# Patient Record
Sex: FEMALE | Race: WHITE | NOT HISPANIC OR LATINO
[De-identification: names, ages, dates, MRNs, and addresses within clinical notes are randomized per-mention and may not be internally consistent; named-entity substitution may affect disease eponyms.]

---

## 2017-01-03 ENCOUNTER — APPOINTMENT (OUTPATIENT)
Dept: PULMONOLOGY | Facility: CLINIC | Age: 67
End: 2017-01-03

## 2017-01-03 ENCOUNTER — OTHER (OUTPATIENT)
Age: 67
End: 2017-01-03

## 2017-01-03 VITALS
WEIGHT: 187 LBS | HEART RATE: 107 BPM | BODY MASS INDEX: 32.1 KG/M2 | DIASTOLIC BLOOD PRESSURE: 80 MMHG | OXYGEN SATURATION: 98 % | SYSTOLIC BLOOD PRESSURE: 152 MMHG | RESPIRATION RATE: 16 BRPM

## 2017-01-03 DIAGNOSIS — Z23 ENCOUNTER FOR IMMUNIZATION: ICD-10-CM

## 2017-03-09 ENCOUNTER — OTHER (OUTPATIENT)
Age: 67
End: 2017-03-09

## 2017-03-21 ENCOUNTER — OTHER (OUTPATIENT)
Age: 67
End: 2017-03-21

## 2017-03-31 ENCOUNTER — OTHER (OUTPATIENT)
Age: 67
End: 2017-03-31

## 2017-07-13 ENCOUNTER — APPOINTMENT (OUTPATIENT)
Dept: PULMONOLOGY | Facility: CLINIC | Age: 67
End: 2017-07-13

## 2017-07-13 VITALS
SYSTOLIC BLOOD PRESSURE: 118 MMHG | HEART RATE: 79 BPM | WEIGHT: 185 LBS | OXYGEN SATURATION: 98 % | BODY MASS INDEX: 31.76 KG/M2 | DIASTOLIC BLOOD PRESSURE: 78 MMHG

## 2017-07-13 DIAGNOSIS — J30.9 ALLERGIC RHINITIS, UNSPECIFIED: ICD-10-CM

## 2017-11-14 ENCOUNTER — APPOINTMENT (OUTPATIENT)
Dept: PULMONOLOGY | Facility: CLINIC | Age: 67
End: 2017-11-14
Payer: COMMERCIAL

## 2017-11-14 VITALS
SYSTOLIC BLOOD PRESSURE: 132 MMHG | WEIGHT: 193 LBS | OXYGEN SATURATION: 98 % | HEART RATE: 78 BPM | BODY MASS INDEX: 33.13 KG/M2 | DIASTOLIC BLOOD PRESSURE: 80 MMHG | RESPIRATION RATE: 16 BRPM

## 2017-11-14 PROCEDURE — 99214 OFFICE O/P EST MOD 30 MIN: CPT

## 2018-03-19 ENCOUNTER — FORM ENCOUNTER (OUTPATIENT)
Age: 68
End: 2018-03-19

## 2018-03-20 ENCOUNTER — OUTPATIENT (OUTPATIENT)
Dept: OUTPATIENT SERVICES | Facility: HOSPITAL | Age: 68
LOS: 1 days | End: 2018-03-20
Payer: COMMERCIAL

## 2018-03-20 ENCOUNTER — APPOINTMENT (OUTPATIENT)
Dept: CT IMAGING | Facility: CLINIC | Age: 68
End: 2018-03-20
Payer: COMMERCIAL

## 2018-03-20 DIAGNOSIS — R91.1 SOLITARY PULMONARY NODULE: ICD-10-CM

## 2018-03-20 PROCEDURE — 71250 CT THORAX DX C-: CPT | Mod: 26

## 2018-03-20 PROCEDURE — 71250 CT THORAX DX C-: CPT

## 2018-05-18 ENCOUNTER — APPOINTMENT (OUTPATIENT)
Dept: PULMONOLOGY | Facility: CLINIC | Age: 68
End: 2018-05-18

## 2018-06-12 ENCOUNTER — APPOINTMENT (OUTPATIENT)
Dept: PULMONOLOGY | Facility: CLINIC | Age: 68
End: 2018-06-12
Payer: COMMERCIAL

## 2018-06-12 VITALS
SYSTOLIC BLOOD PRESSURE: 130 MMHG | DIASTOLIC BLOOD PRESSURE: 80 MMHG | HEART RATE: 78 BPM | OXYGEN SATURATION: 98 % | BODY MASS INDEX: 32.79 KG/M2 | RESPIRATION RATE: 16 BRPM | WEIGHT: 191 LBS

## 2018-06-12 PROCEDURE — 99214 OFFICE O/P EST MOD 30 MIN: CPT | Mod: 25

## 2018-06-12 PROCEDURE — 94010 BREATHING CAPACITY TEST: CPT

## 2018-06-12 RX ORDER — PREDNISONE 10 MG/1
10 TABLET ORAL
Qty: 90 | Refills: 1 | Status: DISCONTINUED | COMMUNITY
Start: 2017-12-19 | End: 2018-06-12

## 2018-12-04 ENCOUNTER — APPOINTMENT (OUTPATIENT)
Dept: PULMONOLOGY | Facility: CLINIC | Age: 68
End: 2018-12-04
Payer: COMMERCIAL

## 2018-12-04 VITALS
BODY MASS INDEX: 33.3 KG/M2 | HEART RATE: 80 BPM | SYSTOLIC BLOOD PRESSURE: 128 MMHG | OXYGEN SATURATION: 96 % | RESPIRATION RATE: 16 BRPM | DIASTOLIC BLOOD PRESSURE: 80 MMHG | WEIGHT: 194 LBS

## 2018-12-04 PROCEDURE — 99214 OFFICE O/P EST MOD 30 MIN: CPT

## 2019-04-01 ENCOUNTER — FORM ENCOUNTER (OUTPATIENT)
Age: 69
End: 2019-04-01

## 2019-04-02 ENCOUNTER — APPOINTMENT (OUTPATIENT)
Dept: CT IMAGING | Facility: CLINIC | Age: 69
End: 2019-04-02
Payer: COMMERCIAL

## 2019-04-02 ENCOUNTER — OUTPATIENT (OUTPATIENT)
Dept: OUTPATIENT SERVICES | Facility: HOSPITAL | Age: 69
LOS: 1 days | End: 2019-04-02
Payer: COMMERCIAL

## 2019-04-02 DIAGNOSIS — R91.1 SOLITARY PULMONARY NODULE: ICD-10-CM

## 2019-04-02 PROCEDURE — 71250 CT THORAX DX C-: CPT | Mod: 26

## 2019-04-02 PROCEDURE — 71250 CT THORAX DX C-: CPT

## 2019-06-04 ENCOUNTER — APPOINTMENT (OUTPATIENT)
Dept: PULMONOLOGY | Facility: CLINIC | Age: 69
End: 2019-06-04
Payer: COMMERCIAL

## 2019-06-04 VITALS
HEART RATE: 82 BPM | OXYGEN SATURATION: 97 % | BODY MASS INDEX: 32.79 KG/M2 | WEIGHT: 191 LBS | SYSTOLIC BLOOD PRESSURE: 142 MMHG | RESPIRATION RATE: 16 BRPM | DIASTOLIC BLOOD PRESSURE: 80 MMHG

## 2019-06-04 PROCEDURE — 99214 OFFICE O/P EST MOD 30 MIN: CPT

## 2019-07-15 ENCOUNTER — MEDICATION RENEWAL (OUTPATIENT)
Age: 69
End: 2019-07-15

## 2019-09-05 ENCOUNTER — APPOINTMENT (OUTPATIENT)
Dept: PULMONOLOGY | Facility: CLINIC | Age: 69
End: 2019-09-05
Payer: COMMERCIAL

## 2019-09-05 VITALS
HEART RATE: 89 BPM | OXYGEN SATURATION: 96 % | SYSTOLIC BLOOD PRESSURE: 130 MMHG | WEIGHT: 188 LBS | HEIGHT: 64 IN | BODY MASS INDEX: 32.1 KG/M2 | DIASTOLIC BLOOD PRESSURE: 80 MMHG

## 2019-09-05 PROCEDURE — 94664 DEMO&/EVAL PT USE INHALER: CPT | Mod: 59

## 2019-09-05 PROCEDURE — 94727 GAS DIL/WSHOT DETER LNG VOL: CPT

## 2019-09-05 PROCEDURE — 94060 EVALUATION OF WHEEZING: CPT

## 2019-09-05 PROCEDURE — 94729 DIFFUSING CAPACITY: CPT

## 2019-09-05 PROCEDURE — 99214 OFFICE O/P EST MOD 30 MIN: CPT | Mod: 25

## 2019-09-05 PROCEDURE — 85018 HEMOGLOBIN: CPT | Mod: QW

## 2020-01-22 ENCOUNTER — TRANSCRIPTION ENCOUNTER (OUTPATIENT)
Age: 70
End: 2020-01-22

## 2020-03-20 ENCOUNTER — OUTPATIENT (OUTPATIENT)
Dept: OUTPATIENT SERVICES | Facility: HOSPITAL | Age: 70
LOS: 1 days | End: 2020-03-20
Payer: COMMERCIAL

## 2020-03-20 ENCOUNTER — APPOINTMENT (OUTPATIENT)
Dept: CT IMAGING | Facility: CLINIC | Age: 70
End: 2020-03-20
Payer: COMMERCIAL

## 2020-03-20 DIAGNOSIS — R91.1 SOLITARY PULMONARY NODULE: ICD-10-CM

## 2020-03-20 DIAGNOSIS — Z00.00 ENCOUNTER FOR GENERAL ADULT MEDICAL EXAMINATION WITHOUT ABNORMAL FINDINGS: ICD-10-CM

## 2020-03-20 PROCEDURE — 71250 CT THORAX DX C-: CPT | Mod: 26

## 2020-03-20 PROCEDURE — 71250 CT THORAX DX C-: CPT

## 2020-04-20 ENCOUNTER — APPOINTMENT (OUTPATIENT)
Dept: THORACIC SURGERY | Facility: CLINIC | Age: 70
End: 2020-04-20
Payer: COMMERCIAL

## 2020-04-20 PROCEDURE — 99202 OFFICE O/P NEW SF 15 MIN: CPT | Mod: 95

## 2020-04-20 NOTE — HISTORY OF PRESENT ILLNESS
[Home] : at home, [unfilled] , at the time of the visit. [Self] : self [Patient] : the patient [Medical Office: ([facility])___] : at the medical office located in  [FreeTextEntry4] : Abe Pace, NP [FreeTextEntry1] : Charles was contacted today by phone for an initial consultation. She has had CT scans of the chest done in the past that demonstrated a small patchy left lower lobe nodule that is mildly increased in size from the previous imaging study. The etiology remains unclear. She does have significant shortness of breath with activity functions well on a daily basis. She denies fever, chills, night sweats, weight loss or weight gain.

## 2020-04-20 NOTE — ASSESSMENT
[FreeTextEntry1] : Elvi is a 69-year-old female with a small patchy nodule in the left lower lobe that has increased mildly in size. The etiology remains unclear but I do believe continued surveillance is appropriate. She is already scheduled for a CT scan in 3 months time and I have asked her to followup with me shortly after that is complete.\par \par Thank you for allowing me to participate in the care of your patient.\par \par Antoine Goel MD\par Department of Cardiovascular and Thoracic Surgery\par \par [employer]\[facility] [employer] at [facility]\par

## 2020-05-29 ENCOUNTER — APPOINTMENT (OUTPATIENT)
Dept: PULMONOLOGY | Facility: CLINIC | Age: 70
End: 2020-05-29

## 2020-07-08 ENCOUNTER — OUTPATIENT (OUTPATIENT)
Dept: OUTPATIENT SERVICES | Facility: HOSPITAL | Age: 70
LOS: 1 days | End: 2020-07-08
Payer: COMMERCIAL

## 2020-07-08 ENCOUNTER — APPOINTMENT (OUTPATIENT)
Dept: CT IMAGING | Facility: CLINIC | Age: 70
End: 2020-07-08
Payer: COMMERCIAL

## 2020-07-08 DIAGNOSIS — R91.1 SOLITARY PULMONARY NODULE: ICD-10-CM

## 2020-07-08 DIAGNOSIS — Z00.00 ENCOUNTER FOR GENERAL ADULT MEDICAL EXAMINATION WITHOUT ABNORMAL FINDINGS: ICD-10-CM

## 2020-07-08 PROCEDURE — 71250 CT THORAX DX C-: CPT | Mod: 26

## 2020-07-08 PROCEDURE — 71250 CT THORAX DX C-: CPT

## 2021-01-26 ENCOUNTER — APPOINTMENT (OUTPATIENT)
Dept: CT IMAGING | Facility: CLINIC | Age: 71
End: 2021-01-26
Payer: MEDICARE

## 2021-01-26 ENCOUNTER — OUTPATIENT (OUTPATIENT)
Dept: OUTPATIENT SERVICES | Facility: HOSPITAL | Age: 71
LOS: 1 days | End: 2021-01-26
Payer: MEDICARE

## 2021-01-26 ENCOUNTER — RESULT REVIEW (OUTPATIENT)
Age: 71
End: 2021-01-26

## 2021-01-26 DIAGNOSIS — R91.8 OTHER NONSPECIFIC ABNORMAL FINDING OF LUNG FIELD: ICD-10-CM

## 2021-01-26 PROCEDURE — 71250 CT THORAX DX C-: CPT

## 2021-01-26 PROCEDURE — 71250 CT THORAX DX C-: CPT | Mod: 26,MH

## 2021-03-03 ENCOUNTER — APPOINTMENT (OUTPATIENT)
Dept: PULMONOLOGY | Facility: CLINIC | Age: 71
End: 2021-03-03

## 2021-03-17 ENCOUNTER — APPOINTMENT (OUTPATIENT)
Dept: PULMONOLOGY | Facility: CLINIC | Age: 71
End: 2021-03-17
Payer: MEDICARE

## 2021-03-17 PROCEDURE — 99214 OFFICE O/P EST MOD 30 MIN: CPT | Mod: 95

## 2021-08-26 ENCOUNTER — OUTPATIENT (OUTPATIENT)
Dept: OUTPATIENT SERVICES | Facility: HOSPITAL | Age: 71
LOS: 1 days | End: 2021-08-26

## 2021-08-26 DIAGNOSIS — R91.1 SOLITARY PULMONARY NODULE: ICD-10-CM

## 2021-08-26 DIAGNOSIS — R91.8 OTHER NONSPECIFIC ABNORMAL FINDING OF LUNG FIELD: ICD-10-CM

## 2021-08-30 ENCOUNTER — OUTPATIENT (OUTPATIENT)
Dept: OUTPATIENT SERVICES | Facility: HOSPITAL | Age: 71
LOS: 1 days | End: 2021-08-30
Payer: MEDICARE

## 2021-08-30 ENCOUNTER — APPOINTMENT (OUTPATIENT)
Dept: CT IMAGING | Facility: CLINIC | Age: 71
End: 2021-08-30
Payer: MEDICARE

## 2021-08-30 DIAGNOSIS — R91.1 SOLITARY PULMONARY NODULE: ICD-10-CM

## 2021-08-30 DIAGNOSIS — R91.8 OTHER NONSPECIFIC ABNORMAL FINDING OF LUNG FIELD: ICD-10-CM

## 2021-08-30 PROCEDURE — 71250 CT THORAX DX C-: CPT | Mod: 26,ME

## 2021-08-30 PROCEDURE — G1004: CPT

## 2021-08-30 PROCEDURE — 71250 CT THORAX DX C-: CPT | Mod: ME

## 2021-09-07 ENCOUNTER — APPOINTMENT (OUTPATIENT)
Dept: PULMONOLOGY | Facility: CLINIC | Age: 71
End: 2021-09-07
Payer: MEDICARE

## 2021-09-07 VITALS
WEIGHT: 177 LBS | DIASTOLIC BLOOD PRESSURE: 74 MMHG | HEART RATE: 79 BPM | OXYGEN SATURATION: 97 % | BODY MASS INDEX: 30.38 KG/M2 | SYSTOLIC BLOOD PRESSURE: 130 MMHG | RESPIRATION RATE: 16 BRPM

## 2021-09-07 DIAGNOSIS — R04.2 HEMOPTYSIS: ICD-10-CM

## 2021-09-07 PROCEDURE — 99214 OFFICE O/P EST MOD 30 MIN: CPT

## 2021-09-14 ENCOUNTER — APPOINTMENT (OUTPATIENT)
Dept: THORACIC SURGERY | Facility: CLINIC | Age: 71
End: 2021-09-14
Payer: MEDICARE

## 2021-09-14 VITALS
BODY MASS INDEX: 30.48 KG/M2 | HEIGHT: 64 IN | DIASTOLIC BLOOD PRESSURE: 78 MMHG | SYSTOLIC BLOOD PRESSURE: 155 MMHG | HEART RATE: 97 BPM | WEIGHT: 178.56 LBS | OXYGEN SATURATION: 86 %

## 2021-09-14 PROCEDURE — 99214 OFFICE O/P EST MOD 30 MIN: CPT

## 2021-09-14 NOTE — DATA REVIEWED
[FreeTextEntry1] : 9.2.21 Non- Contrast CT Chest Scan from [facility] \par Left lower lobe mixed solid and groundglass nodule demonstrate minimal if any interval increase in size since January 26, 2021 and mild interval increase in size of the solid component since March 20, 2018. Primary differential diagnostic consideration is a slow growing primary lung neoplasm.\par \par Emphysema.

## 2021-09-14 NOTE — PHYSICAL EXAM
[] : no respiratory distress [Auscultation Breath Sounds / Voice Sounds] : lungs were clear to auscultation bilaterally [Heart Sounds] : normal S1 and S2 [Heart Rate And Rhythm] : heart rate was normal and rhythm regular [Heart Sounds Gallop] : no gallops [Murmurs] : no murmurs [Heart Sounds Pericardial Friction Rub] : no pericardial rub [Examination Of The Chest] : the chest was normal in appearance [Chest Visual Inspection Thoracic Asymmetry] : no chest asymmetry [Diminished Respiratory Excursion] : normal chest expansion [No Focal Deficits] : no focal deficits [Oriented To Time, Place, And Person] : oriented to person, place, and time [Impaired Insight] : insight and judgment were intact [Affect] : the affect was normal

## 2021-09-14 NOTE — CONSULT LETTER
[Dear  ___] : Dear  [unfilled], [Courtesy Letter:] : I had the pleasure of seeing your patient, [unfilled], in my office today. [Please see my note below.] : Please see my note below. [Consult Closing:] : Thank you very much for allowing me to participate in the care of this patient.  If you have any questions, please do not hesitate to contact me. [Sincerely,] : Sincerely, [FreeTextEntry2] : Dr. Albino Neely

## 2021-09-14 NOTE — ASSESSMENT
[FreeTextEntry1] : Elvi is a 71 year old female with significant COPD and a small nodule in the left lower lobe that has had minimal change since 2018.  I do believe continued surveillance is appropriate at this time and have recommended a CT in 6 months time.\par \par Thank you for allowing me to participate in the care of your patient.\par \par 30 minutes was spent during this encounter.\par \par \par Antoine Goel MD\par Department of Cardiovascular and Thoracic Surgery\par \par [employer]\[facility] [employer] at [facility]\par

## 2022-01-05 ENCOUNTER — APPOINTMENT (OUTPATIENT)
Dept: PULMONOLOGY | Facility: CLINIC | Age: 72
End: 2022-01-05
Payer: MEDICARE

## 2022-01-05 VITALS
BODY MASS INDEX: 30.22 KG/M2 | SYSTOLIC BLOOD PRESSURE: 136 MMHG | HEART RATE: 84 BPM | DIASTOLIC BLOOD PRESSURE: 82 MMHG | OXYGEN SATURATION: 98 % | WEIGHT: 177 LBS | HEIGHT: 64 IN | RESPIRATION RATE: 16 BRPM

## 2022-01-05 PROCEDURE — 99214 OFFICE O/P EST MOD 30 MIN: CPT

## 2022-04-05 ENCOUNTER — OUTPATIENT (OUTPATIENT)
Dept: OUTPATIENT SERVICES | Facility: HOSPITAL | Age: 72
LOS: 1 days | End: 2022-04-05
Payer: MEDICARE

## 2022-04-05 ENCOUNTER — APPOINTMENT (OUTPATIENT)
Dept: CT IMAGING | Facility: CLINIC | Age: 72
End: 2022-04-05
Payer: MEDICARE

## 2022-04-05 DIAGNOSIS — R91.8 OTHER NONSPECIFIC ABNORMAL FINDING OF LUNG FIELD: ICD-10-CM

## 2022-04-05 PROCEDURE — 71250 CT THORAX DX C-: CPT | Mod: ME

## 2022-04-05 PROCEDURE — 71250 CT THORAX DX C-: CPT | Mod: 26,ME

## 2022-04-05 PROCEDURE — G1004: CPT

## 2022-04-11 ENCOUNTER — APPOINTMENT (OUTPATIENT)
Dept: THORACIC SURGERY | Facility: CLINIC | Age: 72
End: 2022-04-11
Payer: MEDICARE

## 2022-04-11 VITALS
HEART RATE: 93 BPM | OXYGEN SATURATION: 96 % | HEIGHT: 64 IN | WEIGHT: 175 LBS | BODY MASS INDEX: 29.88 KG/M2 | RESPIRATION RATE: 16 BRPM

## 2022-04-11 PROCEDURE — 99214 OFFICE O/P EST MOD 30 MIN: CPT

## 2022-04-11 NOTE — PHYSICAL EXAM
[] : no respiratory distress [Auscultation Breath Sounds / Voice Sounds] : lungs were clear to auscultation bilaterally [Examination Of The Chest] : the chest was normal in appearance [Chest Visual Inspection Thoracic Asymmetry] : no chest asymmetry [Diminished Respiratory Excursion] : normal chest expansion [No Spinal Tenderness] : no spinal tenderness [No Focal Deficits] : no focal deficits [Oriented To Time, Place, And Person] : oriented to person, place, and time [Impaired Insight] : insight and judgment were intact [Affect] : the affect was normal

## 2022-04-11 NOTE — DATA REVIEWED
[FreeTextEntry1] : CT Scan of the cehst on 4/7/22 at [facility]\par IMPRESSION:\par \par 1.1 cm subsolid nodule with a 0.5 cm solid component within left lower lobe is not significantly changed from recent prior exams, but increased from 2018. Recommend continued follow-ups. Further evaluation with PET CT is another option.

## 2022-04-11 NOTE — ASSESSMENT
[FreeTextEntry1] : Elvi is a 71-year-old female with a part solid nodule that has been present since approximately 2016. There is no change compared with her last imaging study with some growth since prior studies. Etiology remains unclear, but unfortunately she does have significant COPD, making biopsy, and or removal, difficult. Based on the above I have recommended a CT scan in 6 months time to continue surveillance.\par \par Thank you for allowing me to participate in the care of your patient.\par \par 30 minutes was spent during this encounter.\par \par Antoine Goel MD\par Department of Cardiovascular and Thoracic Surgery\par \par [employer]\par [employer] at \[facility]\""/[facility]\par

## 2022-04-11 NOTE — REVIEW OF SYSTEMS
[SOB on Exertion] : shortness of breath during exertion [Negative] : Heme/Lymph [Feeling Poorly] : not feeling poorly [Feeling Tired] : not feeling tired [Chest Pain] : no chest pain [Palpitations] : no palpitations [Shortness Of Breath] : no shortness of breath [Cough] : no cough

## 2022-04-11 NOTE — HISTORY OF PRESENT ILLNESS
[FreeTextEntry1] : Ms. VENTURA is a 71 year old female who is here for a follow up CT scan of the chest that demonstrated a small patchy left lower lobe nodule that is mildly increased in size from the previous imaging study. The etiology remains unclear. Of note she does have significant shortness of breath with activity functions on a daily basis. \par \par She denies fever, chills, night sweats, weight loss or weight gain. Discussed in our previous visit was scheduling Non- Contrast CT Chest Scan for surveillance purposes. She is here to review her latest results. \par \par

## 2022-05-27 ENCOUNTER — APPOINTMENT (OUTPATIENT)
Dept: PULMONOLOGY | Facility: CLINIC | Age: 72
End: 2022-05-27
Payer: MEDICARE

## 2022-05-27 VITALS
DIASTOLIC BLOOD PRESSURE: 82 MMHG | WEIGHT: 179 LBS | SYSTOLIC BLOOD PRESSURE: 130 MMHG | HEIGHT: 64 IN | HEART RATE: 85 BPM | RESPIRATION RATE: 16 BRPM | BODY MASS INDEX: 30.56 KG/M2 | OXYGEN SATURATION: 99 %

## 2022-05-27 PROCEDURE — 99214 OFFICE O/P EST MOD 30 MIN: CPT

## 2022-05-27 RX ORDER — PREDNISONE 10 MG/1
10 TABLET ORAL
Qty: 21 | Refills: 0 | Status: DISCONTINUED | COMMUNITY
Start: 2022-01-05 | End: 2022-05-27

## 2022-10-18 ENCOUNTER — APPOINTMENT (OUTPATIENT)
Dept: CT IMAGING | Facility: CLINIC | Age: 72
End: 2022-10-18

## 2022-10-18 ENCOUNTER — OUTPATIENT (OUTPATIENT)
Dept: OUTPATIENT SERVICES | Facility: HOSPITAL | Age: 72
LOS: 1 days | End: 2022-10-18
Payer: MEDICARE

## 2022-10-18 DIAGNOSIS — R91.1 SOLITARY PULMONARY NODULE: ICD-10-CM

## 2022-10-18 PROCEDURE — G1004: CPT

## 2022-10-18 PROCEDURE — 71250 CT THORAX DX C-: CPT | Mod: 26,ME

## 2022-10-18 PROCEDURE — 71250 CT THORAX DX C-: CPT | Mod: ME

## 2022-11-22 ENCOUNTER — APPOINTMENT (OUTPATIENT)
Dept: PULMONOLOGY | Facility: CLINIC | Age: 72
End: 2022-11-22

## 2022-11-22 ENCOUNTER — APPOINTMENT (OUTPATIENT)
Dept: THORACIC SURGERY | Facility: CLINIC | Age: 72
End: 2022-11-22

## 2022-11-22 VITALS
SYSTOLIC BLOOD PRESSURE: 158 MMHG | WEIGHT: 182 LBS | DIASTOLIC BLOOD PRESSURE: 84 MMHG | OXYGEN SATURATION: 99 % | HEIGHT: 64 IN | BODY MASS INDEX: 31.07 KG/M2 | HEART RATE: 88 BPM

## 2022-11-22 VITALS
RESPIRATION RATE: 16 BRPM | OXYGEN SATURATION: 97 % | HEART RATE: 74 BPM | SYSTOLIC BLOOD PRESSURE: 158 MMHG | DIASTOLIC BLOOD PRESSURE: 80 MMHG

## 2022-11-22 PROCEDURE — 99214 OFFICE O/P EST MOD 30 MIN: CPT | Mod: 25

## 2022-11-22 PROCEDURE — 94727 GAS DIL/WSHOT DETER LNG VOL: CPT

## 2022-11-22 PROCEDURE — 85018 HEMOGLOBIN: CPT | Mod: QW

## 2022-11-22 PROCEDURE — 99214 OFFICE O/P EST MOD 30 MIN: CPT

## 2022-11-22 PROCEDURE — 94729 DIFFUSING CAPACITY: CPT

## 2022-11-22 PROCEDURE — 94010 BREATHING CAPACITY TEST: CPT

## 2022-11-22 NOTE — HISTORY OF PRESENT ILLNESS
[FreeTextEntry1] : Hx emphysema, COPD, lung nodules.\par \par At baseline.  Uses proaire rarely. Some SOB with heavier exertion. No wheeze.   \par \par F/U CT chest done reviewed below. Slight change in density of nodule since 2019. Saw Dr Goel who agreed on observation. \par \par Does worse in Winter usually. \par \par Gets no exercise. \par \par Quit smoking 2004/05.\par \par

## 2022-11-22 NOTE — PROCEDURE
[FreeTextEntry1] : PFT done today: obstruction in the severe range; air trapping; DLCO normal. No significant change.\par \par -----------\par EXAM: [id number] - CT CHEST - ORDERED BY: DIRK VIDAL\par \par \par PROCEDURE DATE: 10/18/2022\par \par \par \par INTERPRETATION: CLINICAL INFORMATION: Brachiocephalic nodule left lung all\par \par TECHNIQUE: A volumetric CT acquisition of the chest was obtained from the thoracic inlet to the upper abdomen, without the administration of intravenous contrast. Coronal and sagittal multiplanar reformations were also submitted.\par \par Comparison: 4/5/2022, 4/2/2019\par \par FINDINGS:\par \par Lungs/Airways/Pleura: Stable 1.1 x 1 cm part solid nodule in the left lower lobe on image 647 series 5 with 0.5 cm solid component. There is been slight increase in density of this nodule compared to 4/2019 and minimal increase in size when it measured 0.9 x 0.9 cm. The solid component is also minimally increased. Apical predominant centrilobular and paraseptal emphysema is unchanged.. No new lung nodule. No pleural effusion.\par \par Mediastinum/Lymph nodes: No thoracic adenopathy.\par \par Heart and Vessels: The heart is normal in size. No pericardial effusion. There are mild coronary artery calcifications. The aorta is normal in caliber.\par \par Upper Abdomen: Unremarkable.\par \par Osseous structures and Soft Tissues: No aggressive bone lesions.\par \par IMPRESSION:\par \par 1. Stable part solid left lower lobe nodule, minimally progressed since 4/2019. Reassessment can be made on surveillance imaging\par \par 2. Emphysema\par \par --- End of Report ---\par \par \par \par \par \par \par  CLARISA CASTILLO M.D., Attending Radiologist\par This document has been electronically signed. Oct 21 2022 11:59AM

## 2022-11-22 NOTE — DATA REVIEWED
[FreeTextEntry1] : CT CHEST  - ORDERED BY: DIRK VIDAL\par \par \par PROCEDURE DATE:  10/18/2022\par \par \par \par INTERPRETATION:  CLINICAL INFORMATION: Brachiocephalic nodule left lung all\par \par TECHNIQUE:  A volumetric CT acquisition of the chest was obtained from the thoracic inlet to the upper abdomen, without the administration  of intravenous contrast. Coronal and sagittal multiplanar reformations were also submitted.\par \par Comparison: 4/5/2022, 4/2/2019\par \par FINDINGS:\par \par Lungs/Airways/Pleura: Stable 1.1 x 1 cm part solid nodule in the left lower lobe on image 647 series 5 with 0.5 cm solid component. There is been slight increase in density of this nodule compared to 4/2019 and minimal increase in size when it measured 0.9 x 0.9 cm. The solid component is also minimally increased. Apical predominant centrilobular and paraseptal emphysema is unchanged.. No new lung nodule. No pleural effusion.\par \par Mediastinum/Lymph nodes: No thoracic adenopathy.\par \par Heart and Vessels: The heart is normal in size. No pericardial effusion. There are mild coronary artery calcifications. The aorta is normal in caliber.\par \par Upper Abdomen: Unremarkable.\par \par Osseous structures and Soft Tissues: No aggressive bone lesions.\par \par IMPRESSION:\par \par 1.  Stable part solid left lower lobe nodule, minimally progressed since 4/2019. Reassessment can be made on surveillance imaging\par \par 2.  Emphysema\par \par --- End of Report ---\par \par \par

## 2022-11-22 NOTE — REVIEW OF SYSTEMS
[Fever] : no fever [Chills] : no chills [Fatigue] : no fatigue [Poor Appetite] : normal appetite  [Nasal Congestion] : nasal congestion [Epistaxis] : no nosebleeds [Postnasal Drip] : postnasal drip [Sinus Problems] : sinus problems [PND] : no PND [Orthopnea] : no orthopnea [Murmurs] : no murmurs were heard [Palpitations] : no palpitations [Edema] : ~T edema was not present [As Noted in HPI] : as noted in HPI [Nasal Discharge] : nasal discharge [Heartburn] : no heartburn [Reflux] : no reflux [Indigestion] : no indigestion [Nocturia] : no nocturia [Frequency] : no change in urinary frequency [Dysuria] : no dysuria [Trauma] : no ~T physical trauma [Fracture] : no fracture [Anemia] : no anemia [Headache] : no headache [Dizziness] : no dizziness [Syncope] : no fainting

## 2022-11-22 NOTE — ASSESSMENT
[FreeTextEntry1] : Elvi is a 72-year-old female with a history of COPD and a small nodule in the left lower lobe that is patchy part solid and has remained largely unchanged for several years.  I do believe continued surveillance is appropriate and I have asked her to obtain a CT scan in 6 months time.\par \par Thank you for allowing me to participate in the care of your patient.\par \par 30 minutes was spent during this encounter.\par \par Antoine Goel MD\par Department of Cardiovascular and Thoracic Surgery\par \par [employer]\[facility] [employer] at [facility]\par

## 2022-11-22 NOTE — CONSULT LETTER
[Dear  ___] : Dear  [unfilled], [Courtesy Letter:] : I had the pleasure of seeing your patient, [unfilled], in my office today. [Consult Closing:] : Thank you very much for allowing me to participate in the care of this patient.  If you have any questions, please do not hesitate to contact me. [Albino Neely MD] : Albino Neely MD

## 2023-03-13 ENCOUNTER — APPOINTMENT (OUTPATIENT)
Dept: PULMONOLOGY | Facility: CLINIC | Age: 73
End: 2023-03-13

## 2023-03-23 ENCOUNTER — APPOINTMENT (OUTPATIENT)
Dept: PULMONOLOGY | Facility: CLINIC | Age: 73
End: 2023-03-23
Payer: MEDICARE

## 2023-03-23 PROCEDURE — ZZZZZ: CPT

## 2023-03-28 ENCOUNTER — APPOINTMENT (OUTPATIENT)
Dept: PULMONOLOGY | Facility: CLINIC | Age: 73
End: 2023-03-28
Payer: MEDICARE

## 2023-03-28 PROCEDURE — 94625 PHY/QHP OP PULM RHB W/O MNTR: CPT

## 2023-03-30 ENCOUNTER — APPOINTMENT (OUTPATIENT)
Dept: PULMONOLOGY | Facility: CLINIC | Age: 73
End: 2023-03-30
Payer: MEDICARE

## 2023-03-30 PROCEDURE — 94625 PHY/QHP OP PULM RHB W/O MNTR: CPT

## 2023-04-04 ENCOUNTER — APPOINTMENT (OUTPATIENT)
Dept: PULMONOLOGY | Facility: CLINIC | Age: 73
End: 2023-04-04
Payer: MEDICARE

## 2023-04-04 PROCEDURE — 94625 PHY/QHP OP PULM RHB W/O MNTR: CPT

## 2023-04-06 ENCOUNTER — APPOINTMENT (OUTPATIENT)
Dept: PULMONOLOGY | Facility: CLINIC | Age: 73
End: 2023-04-06
Payer: MEDICARE

## 2023-04-06 PROCEDURE — 94625 PHY/QHP OP PULM RHB W/O MNTR: CPT

## 2023-04-11 ENCOUNTER — APPOINTMENT (OUTPATIENT)
Dept: PULMONOLOGY | Facility: CLINIC | Age: 73
End: 2023-04-11
Payer: MEDICARE

## 2023-04-11 PROCEDURE — 94625 PHY/QHP OP PULM RHB W/O MNTR: CPT

## 2023-04-13 ENCOUNTER — APPOINTMENT (OUTPATIENT)
Dept: PULMONOLOGY | Facility: CLINIC | Age: 73
End: 2023-04-13

## 2023-04-18 ENCOUNTER — APPOINTMENT (OUTPATIENT)
Dept: PULMONOLOGY | Facility: CLINIC | Age: 73
End: 2023-04-18
Payer: MEDICARE

## 2023-04-18 PROCEDURE — 94625 PHY/QHP OP PULM RHB W/O MNTR: CPT

## 2023-04-20 ENCOUNTER — APPOINTMENT (OUTPATIENT)
Dept: PULMONOLOGY | Facility: CLINIC | Age: 73
End: 2023-04-20
Payer: MEDICARE

## 2023-04-20 PROCEDURE — 94625 PHY/QHP OP PULM RHB W/O MNTR: CPT

## 2023-04-25 ENCOUNTER — APPOINTMENT (OUTPATIENT)
Dept: PULMONOLOGY | Facility: CLINIC | Age: 73
End: 2023-04-25
Payer: MEDICARE

## 2023-04-25 PROCEDURE — 94625 PHY/QHP OP PULM RHB W/O MNTR: CPT

## 2023-04-27 ENCOUNTER — APPOINTMENT (OUTPATIENT)
Dept: PULMONOLOGY | Facility: CLINIC | Age: 73
End: 2023-04-27
Payer: MEDICARE

## 2023-04-27 PROCEDURE — 94625 PHY/QHP OP PULM RHB W/O MNTR: CPT

## 2023-05-02 ENCOUNTER — APPOINTMENT (OUTPATIENT)
Dept: PULMONOLOGY | Facility: CLINIC | Age: 73
End: 2023-05-02
Payer: MEDICARE

## 2023-05-02 PROCEDURE — 94625 PHY/QHP OP PULM RHB W/O MNTR: CPT

## 2023-05-04 ENCOUNTER — APPOINTMENT (OUTPATIENT)
Dept: PULMONOLOGY | Facility: CLINIC | Age: 73
End: 2023-05-04

## 2023-05-05 ENCOUNTER — APPOINTMENT (OUTPATIENT)
Dept: CT IMAGING | Facility: CLINIC | Age: 73
End: 2023-05-05

## 2023-05-05 ENCOUNTER — OUTPATIENT (OUTPATIENT)
Dept: OUTPATIENT SERVICES | Facility: HOSPITAL | Age: 73
LOS: 1 days | End: 2023-05-05
Payer: MEDICARE

## 2023-05-05 DIAGNOSIS — R91.1 SOLITARY PULMONARY NODULE: ICD-10-CM

## 2023-05-05 PROCEDURE — 71250 CT THORAX DX C-: CPT | Mod: 26,MH

## 2023-05-05 PROCEDURE — 71250 CT THORAX DX C-: CPT

## 2023-05-09 ENCOUNTER — APPOINTMENT (OUTPATIENT)
Dept: PULMONOLOGY | Facility: CLINIC | Age: 73
End: 2023-05-09
Payer: MEDICARE

## 2023-05-09 PROCEDURE — 94625 PHY/QHP OP PULM RHB W/O MNTR: CPT

## 2023-05-11 ENCOUNTER — APPOINTMENT (OUTPATIENT)
Dept: PULMONOLOGY | Facility: CLINIC | Age: 73
End: 2023-05-11
Payer: MEDICARE

## 2023-05-11 PROCEDURE — 94625 PHY/QHP OP PULM RHB W/O MNTR: CPT

## 2023-05-16 ENCOUNTER — APPOINTMENT (OUTPATIENT)
Dept: PULMONOLOGY | Facility: CLINIC | Age: 73
End: 2023-05-16
Payer: MEDICARE

## 2023-05-16 PROCEDURE — 94625 PHY/QHP OP PULM RHB W/O MNTR: CPT

## 2023-05-18 ENCOUNTER — APPOINTMENT (OUTPATIENT)
Dept: PULMONOLOGY | Facility: CLINIC | Age: 73
End: 2023-05-18
Payer: MEDICARE

## 2023-05-18 PROCEDURE — 94625 PHY/QHP OP PULM RHB W/O MNTR: CPT

## 2023-05-23 ENCOUNTER — APPOINTMENT (OUTPATIENT)
Dept: PULMONOLOGY | Facility: CLINIC | Age: 73
End: 2023-05-23
Payer: MEDICARE

## 2023-05-23 PROCEDURE — 94625 PHY/QHP OP PULM RHB W/O MNTR: CPT

## 2023-05-25 ENCOUNTER — APPOINTMENT (OUTPATIENT)
Dept: PULMONOLOGY | Facility: CLINIC | Age: 73
End: 2023-05-25
Payer: MEDICARE

## 2023-05-25 PROCEDURE — 94625 PHY/QHP OP PULM RHB W/O MNTR: CPT

## 2023-05-30 ENCOUNTER — APPOINTMENT (OUTPATIENT)
Dept: PULMONOLOGY | Facility: CLINIC | Age: 73
End: 2023-05-30
Payer: MEDICARE

## 2023-05-30 PROCEDURE — 94625 PHY/QHP OP PULM RHB W/O MNTR: CPT

## 2023-06-01 ENCOUNTER — APPOINTMENT (OUTPATIENT)
Dept: PULMONOLOGY | Facility: CLINIC | Age: 73
End: 2023-06-01

## 2023-06-06 ENCOUNTER — APPOINTMENT (OUTPATIENT)
Dept: PULMONOLOGY | Facility: CLINIC | Age: 73
End: 2023-06-06

## 2023-06-08 ENCOUNTER — APPOINTMENT (OUTPATIENT)
Dept: PULMONOLOGY | Facility: CLINIC | Age: 73
End: 2023-06-08

## 2023-06-13 ENCOUNTER — APPOINTMENT (OUTPATIENT)
Dept: PULMONOLOGY | Facility: CLINIC | Age: 73
End: 2023-06-13

## 2023-06-15 ENCOUNTER — APPOINTMENT (OUTPATIENT)
Dept: PULMONOLOGY | Facility: CLINIC | Age: 73
End: 2023-06-15

## 2023-06-20 ENCOUNTER — APPOINTMENT (OUTPATIENT)
Dept: PULMONOLOGY | Facility: CLINIC | Age: 73
End: 2023-06-20
Payer: MEDICARE

## 2023-06-20 PROCEDURE — 94625 PHY/QHP OP PULM RHB W/O MNTR: CPT

## 2023-06-22 ENCOUNTER — APPOINTMENT (OUTPATIENT)
Dept: PULMONOLOGY | Facility: CLINIC | Age: 73
End: 2023-06-22
Payer: MEDICARE

## 2023-06-22 PROCEDURE — 94625 PHY/QHP OP PULM RHB W/O MNTR: CPT

## 2023-06-27 ENCOUNTER — APPOINTMENT (OUTPATIENT)
Dept: PULMONOLOGY | Facility: CLINIC | Age: 73
End: 2023-06-27
Payer: MEDICARE

## 2023-06-27 PROCEDURE — 94625 PHY/QHP OP PULM RHB W/O MNTR: CPT

## 2023-06-29 ENCOUNTER — APPOINTMENT (OUTPATIENT)
Dept: PULMONOLOGY | Facility: CLINIC | Age: 73
End: 2023-06-29

## 2023-07-05 ENCOUNTER — APPOINTMENT (OUTPATIENT)
Dept: PULMONOLOGY | Facility: CLINIC | Age: 73
End: 2023-07-05
Payer: MEDICARE

## 2023-07-05 VITALS
DIASTOLIC BLOOD PRESSURE: 78 MMHG | RESPIRATION RATE: 16 BRPM | OXYGEN SATURATION: 97 % | SYSTOLIC BLOOD PRESSURE: 128 MMHG

## 2023-07-05 VITALS — WEIGHT: 183 LBS | BODY MASS INDEX: 31.24 KG/M2 | HEIGHT: 64 IN

## 2023-07-05 PROCEDURE — 99214 OFFICE O/P EST MOD 30 MIN: CPT

## 2023-07-05 NOTE — PROCEDURE
[FreeTextEntry1] : EXAM: [id number] - CT CHEST - ORDERED BY: DIRK VIDAL\par \par \par PROCEDURE DATE: 05/05/2023\par \par \par \par INTERPRETATION: INDICATION: Left lower lobe nodule\par \par TECHNIQUE: A volumetric CT acquisition of the chest was obtained from the thoracic inlet to the upper abdomen without the use of intravenous contrast. Coronal and sagittal reconstructed images are provided.\par \par COMPARISON: Chest CT 10/18/2022\par \par FINDINGS:\par \par Lungs/Airways/Pleura: Emphysema is present. Biapical scarring. The central airways are patent. No pleural effusion. Part solid and groundglass 1.1 cm left lower lobe nodule on series 3, image 103 with a 4 mm solid component remains unchanged since the prior study and increased in size and density compared to 2016. Stable 4 mm nodule in the posterior left upper lobe abutting the fissure on image 66 since 2016. No new nodules.\par \par Mediastinum/Lymph nodes: No thoracic adenopathy. Unremarkable thyroid.\par \par Heart and Vessels: The great vessels are normal in size. The heart is normal in size. No pericardial effusion. Qualitatively mild coronary artery calcification.\par \par Upper Abdomen: Infrarenal aortic calcification.\par \par Osseous structures and Soft Tissues: No aggressive bone lesions.\par \par IMPRESSION:\par Unchanged 1.1 cm part solid left lower lobe nodule (4 mm solid component) when compared to the prior study, increased in both size and density since 2016. Continued surveillance recommended.\par \par No new nodules.\par \par --- End of Report ---\par \par \par \par \par \par \par ESME THOMPSON M.D., Attending Radiologist

## 2023-07-05 NOTE — REVIEW OF SYSTEMS
[Nasal Congestion] : nasal congestion [Postnasal Drip] : postnasal drip [Sinus Problems] : sinus problems [As Noted in HPI] : as noted in HPI [Nasal Discharge] : nasal discharge [Fever] : no fever [Chills] : no chills [Fatigue] : no fatigue [Poor Appetite] : normal appetite  [Epistaxis] : no nosebleeds [PND] : no PND [Orthopnea] : no orthopnea [Murmurs] : no murmurs were heard [Palpitations] : no palpitations [Edema] : ~T edema was not present [Heartburn] : no heartburn [Reflux] : no reflux [Indigestion] : no indigestion [Nocturia] : no nocturia [Frequency] : no change in urinary frequency [Dysuria] : no dysuria [Trauma] : no ~T physical trauma [Fracture] : no fracture [Anemia] : no anemia [Headache] : no headache [Dizziness] : no dizziness [Syncope] : no fainting

## 2023-07-05 NOTE — HISTORY OF PRESENT ILLNESS
[Former] : former [YearQuit] : 2004/05 [FreeTextEntry1] : Hx emphysema, COPD, lung nodules.\par \par At baseline.  Uses proaire; needed it last week during a bad air quality day. Some SOB with heavier exertion. No wheeze.   \par \par Doing pulm rehab; just about completed. \par \par F/U CT chest done reviewed below.  Saw Dr Goel who agreed on observation. \par \par Does worse in Winter usually. \par \par Quit smoking 2004/05.\par \par

## 2023-07-05 NOTE — PHYSICAL EXAM
[General Appearance - Well Developed] : well developed [Normal Appearance] : normal appearance [General Appearance - Well Nourished] : well nourished [No Deformities] : no deformities [Normal Conjunctiva] : the conjunctiva exhibited no abnormalities [Eyelids - No Xanthelasma] : the eyelids demonstrated no xanthelasmas [Neck Appearance] : the appearance of the neck was normal [Neck Cervical Mass (___cm)] : no neck mass was observed [Jugular Venous Distention Increased] : there was no jugular-venous distention [Heart Rate And Rhythm] : heart rate and rhythm were normal [Heart Sounds] : normal S1 and S2 [Murmurs] : no murmurs present [Arterial Pulses Normal] : the arterial pulses were normal [] : no respiratory distress [Respiration, Rhythm And Depth] : normal respiratory rhythm and effort [Exaggerated Use Of Accessory Muscles For Inspiration] : no accessory muscle use [Auscultation Breath Sounds / Voice Sounds] : lungs were clear to auscultation bilaterally [Bowel Sounds] : normal bowel sounds [Abdomen Soft] : soft [Abnormal Walk] : normal gait [Cranial Nerves] : cranial nerves 2-12 were intact [Deep Tendon Reflexes (DTR)] : deep tendon reflexes were 2+ and symmetric [Oriented To Time, Place, And Person] : oriented to person, place, and time [Impaired Insight] : insight and judgment were intact [Normal Oral Mucosa] : normal oral mucosa [No Oral Pallor] : no oral pallor [No Oral Cyanosis] : no oral cyanosis [Normal Oropharynx] : normal oropharynx [Nail Clubbing] : no clubbing of the fingernails [Cyanosis, Localized] : no localized cyanosis

## 2023-07-11 ENCOUNTER — APPOINTMENT (OUTPATIENT)
Dept: PULMONOLOGY | Facility: CLINIC | Age: 73
End: 2023-07-11
Payer: MEDICARE

## 2023-07-11 PROCEDURE — 94625 PHY/QHP OP PULM RHB W/O MNTR: CPT

## 2023-07-24 ENCOUNTER — APPOINTMENT (OUTPATIENT)
Dept: THORACIC SURGERY | Facility: CLINIC | Age: 73
End: 2023-07-24
Payer: MEDICARE

## 2023-07-24 DIAGNOSIS — R05.9 COUGH, UNSPECIFIED: ICD-10-CM

## 2023-07-24 PROCEDURE — 99442: CPT | Mod: 95

## 2023-07-24 RX ORDER — CLINDAMYCIN PHOSPHATE 10 MG/ML
1 SOLUTION TOPICAL
Qty: 60 | Refills: 0 | Status: COMPLETED | COMMUNITY
Start: 2023-04-10 | End: 2023-07-24

## 2023-07-24 RX ORDER — KETOCONAZOLE 20 MG/G
2 CREAM TOPICAL
Qty: 60 | Refills: 0 | Status: COMPLETED | COMMUNITY
Start: 2023-05-20 | End: 2023-07-24

## 2023-07-24 RX ORDER — PREDNISONE 10 MG/1
10 TABLET ORAL
Qty: 21 | Refills: 0 | Status: COMPLETED | COMMUNITY
Start: 2022-05-27 | End: 2023-07-24

## 2023-07-24 NOTE — REASON FOR VISIT
[Home] : at home, [unfilled] , at the time of the visit. [Medical Office: ([facility])___] : at the medical office located in  [This encounter was initiated by telehealth (audio with video) and converted to telephone (audio only) due to technical difficulties.] : This encounter was initiated by telehealth (audio with video) and converted to telephone (audio only) due to technical difficulties. [Patient] : the patient

## 2023-07-26 NOTE — HISTORY OF PRESENT ILLNESS
[FreeTextEntry1] : Ms. CARLITOS VENTURA is a 72 year female who presents today for followup. Previously, she has been followed for left lower lobe nodule which has slowly demonstrated mild growth since 2019. She presents today to review and discuss surveillance 1 year CT Chest results. \par \par Additional past medical history includes asthma, chronic obstructive pulmonary disease, diverticulosis\par \par Today the patient reports feeling well. She does have some mild shortness of breath with exertion but it's unchanged. Denies fever, chills, nausea, chest pain, palpitation, swelling or weight loss. \par

## 2023-07-26 NOTE — ASSESSMENT
[FreeTextEntry1] : LLL 1.1cm part solid nodule, overall increased size and density since 2016 but unchanged since prior\par - recommend PET/CT

## 2023-12-19 ENCOUNTER — APPOINTMENT (OUTPATIENT)
Dept: CT IMAGING | Facility: CLINIC | Age: 73
End: 2023-12-19
Payer: MEDICARE

## 2023-12-19 ENCOUNTER — OUTPATIENT (OUTPATIENT)
Dept: OUTPATIENT SERVICES | Facility: HOSPITAL | Age: 73
LOS: 1 days | End: 2023-12-19
Payer: MEDICARE

## 2023-12-19 DIAGNOSIS — R91.8 OTHER NONSPECIFIC ABNORMAL FINDING OF LUNG FIELD: ICD-10-CM

## 2023-12-19 DIAGNOSIS — R91.1 SOLITARY PULMONARY NODULE: ICD-10-CM

## 2023-12-19 PROCEDURE — 71250 CT THORAX DX C-: CPT | Mod: 26,MH

## 2023-12-19 PROCEDURE — 71250 CT THORAX DX C-: CPT

## 2024-01-04 ENCOUNTER — APPOINTMENT (OUTPATIENT)
Dept: PULMONOLOGY | Facility: CLINIC | Age: 74
End: 2024-01-04
Payer: MEDICARE

## 2024-01-04 VITALS
WEIGHT: 187 LBS | BODY MASS INDEX: 31.92 KG/M2 | OXYGEN SATURATION: 98 % | RESPIRATION RATE: 16 BRPM | SYSTOLIC BLOOD PRESSURE: 138 MMHG | DIASTOLIC BLOOD PRESSURE: 80 MMHG | HEIGHT: 64 IN | HEART RATE: 72 BPM

## 2024-01-04 DIAGNOSIS — J45.909 UNSPECIFIED ASTHMA, UNCOMPLICATED: ICD-10-CM

## 2024-01-04 DIAGNOSIS — Z03.89 ENCOUNTER FOR OBSERVATION FOR OTHER SUSPECTED DISEASES AND CONDITIONS RULED OUT: ICD-10-CM

## 2024-01-04 PROCEDURE — 99214 OFFICE O/P EST MOD 30 MIN: CPT

## 2024-01-04 NOTE — PLAN
[TextEntry] : PET/CT scan. Albuterol prn. Continue nasonex prn. Nasal saline.  Reviewed symptoms of AECOPD. Exercise. F/U with CTS. Reviewed that the nodule could represent a slow growin malignancy. Will confer with CTS about resection vs waiting if PET neg. Annual flu vaccine.

## 2024-01-04 NOTE — HISTORY OF PRESENT ILLNESS
[Former] : former [YearQuit] : 2004/05 [FreeTextEntry1] : Hx emphysema, COPD, lung nodules.  At baseline.  Uses proaire; needed it last week during a bad air quality day. Some SOB with heavier exertion. No wheeze.     Completed pulm rehab. Not doing exercise on her own though.   F/U CT chest done reviewed below.  Saw Dr Guillaume in the summer; PET recc; she did not do it; unclear why.   Does worse in Winter usually.   Quit smoking 2004/05.

## 2024-01-04 NOTE — PROCEDURE
[FreeTextEntry1] :  EXAM: [id number] - CT CHEST - ORDERED BY: NOA TURNER DATE: 12/19/2023    INTERPRETATION: EXAMINATION: CT CHEST  CLINICAL INDICATION: Lung nodule.  TECHNIQUE: Noncontrast CT of the chest was obtained.  COMPARISON: Multiple CT, most recent 5/5/2023.  FINDINGS:  AIRWAYS AND LUNGS: The central tracheobronchial tree is patent. 1.1 cm left lower lobe part solid nodule with 4 mm solid component is unchanged from several prior studies but enlarged from 2016. Emphysema is present.  MEDIASTINUM AND PLEURA: There are no enlarged mediastinal, hilar or axillary lymph nodes. The visualized portion of the thyroid gland is unremarkable. There is no pleural effusion. There is no pneumothorax.  HEART AND VESSELS: The heart is normal in size. There are atherosclerotic calcifications of the aorta and coronary arteries. There is no pericardial effusion.  UPPER ABDOMEN: Images of the upper abdomen demonstrate bilateral parapelvic renal cysts.  BONES AND SOFT TISSUES: The bones are unremarkable. The soft tissues are unremarkable.  IMPRESSION: 1.1 cm left lower lobe part solid nodule with 4 mm solid component is unchanged from several prior studies but enlarged from 2016. Indolent malignancy is the primary consideration.  --- End of Report ---       JHONNY MERCADO MD; Attending Radiologist This document has been electronically signed. Jan 1 2024 2:43PM

## 2024-01-12 RX ORDER — ALBUTEROL SULFATE 90 UG/1
108 (90 BASE) INHALANT RESPIRATORY (INHALATION)
Qty: 3 | Refills: 0 | Status: ACTIVE | COMMUNITY
Start: 2017-01-03 | End: 1900-01-01

## 2024-03-22 NOTE — DATA REVIEWED
In an effort to ensure that our patients LiveWell, a Team Member has reviewed your chart and identified an opportunity to provide the best care possible. An attempt was made to discuss or schedule overdue Preventive or Disease Management screening.     The Outcome was Contact was made, appointment declined. Care Gaps include Immunizations and Wellness Visits.  Name: ALEX OLMSTEAD    ### Patient Details  YOB: 1945  MRN: [id number]    ### Encounter Details  Arrival Date: N/A  Discharge Date: N/A  Encounter ID: [id number]    ### Related interaction  Located within [facility] [place] Annual Wellness Outreach (Annual Wellness Outreach ([place])) ([email or web address])    ### Questions     Question 1   Annual Wellness   If you would like to speak with someone now to schedule your wellness visit, press 1; if you would like us to call you back later to schedule your wellness visit, press 2; if you would prefer not to schedule an appointment at this time, please press 3, or if you've already scheduled a wellness visit this year press 4.   Transfer Now (VOICE) (Issue Panel: Annual Wellness Live Transfer )    ### Required Interventions and Feedback     Call Status         Call Status List:     Inbound Call (edited by HENRY on 03/22/2024 12:02 PM CDT)     Appointment Scheduling         Unable to Schedule MWV Appointment:     Patient declined (edited by HENRY on 03/22/2024 12:02 PM CDT)  
[FreeTextEntry1] : CT Scan of the chest from 05/05/23\par COMPARISON: Chest CT 10/18/2022\par \par FINDINGS:\par \par Lungs/Airways/Pleura: Emphysema is present. Biapical scarring. The central airways are patent. No pleural effusion. Part solid and groundglass 1.1 cm left lower lobe nodule on series 3, image 103 with a 4 mm solid component remains unchanged since the prior study and increased in size and density compared to 2016. Stable 4 mm nodule in the posterior left upper lobe abutting the fissure on image 66 since 2016. No new nodules.\par \par Mediastinum/Lymph nodes: No thoracic adenopathy. Unremarkable thyroid.\par \par Heart and Vessels: The great vessels are normal in size. The heart is normal in size. No pericardial effusion. Qualitatively mild coronary artery calcification.\par \par Upper Abdomen: Infrarenal aortic calcification.\par \par Osseous structures and Soft Tissues: No aggressive bone lesions.\par \par IMPRESSION:\par Unchanged 1.1 cm part solid left lower lobe nodule (4 mm solid component) when compared to the prior study, increased in both size and density since 2016. Continued surveillance recommended.\par \par No new nodules.\par \par \par \par \par \par CT CHEST - \par PROCEDURE DATE: 10/18/2022\par \par INTERPRETATION: CLINICAL INFORMATION: Brachiocephalic nodule left lung all\par \par TECHNIQUE: A volumetric CT acquisition of the chest was obtained from the thoracic inlet to the upper abdomen, without the administration of intravenous contrast. Coronal and sagittal multiplanar reformations were also submitted.\par \par Comparison: 4/5/2022, 4/2/2019\par \par FINDINGS:\par \par Lungs/Airways/Pleura: Stable 1.1 x 1 cm part solid nodule in the left lower lobe on image 647 series 5 with 0.5 cm solid component. There is been slight increase in density of this nodule compared to 4/2019 and minimal increase in size when it measured 0.9 x 0.9 cm. The solid component is also minimally increased. Apical predominant centrilobular and paraseptal emphysema is unchanged.. No new lung nodule. No pleural effusion.\par \par Mediastinum/Lymph nodes: No thoracic adenopathy.\par \par Heart and Vessels: The heart is normal in size. No pericardial effusion. There are mild coronary artery calcifications. The aorta is normal in caliber.\par \par Upper Abdomen: Unremarkable.\par \par Osseous structures and Soft Tissues: No aggressive bone lesions.\par \par IMPRESSION:\par \par 1. Stable part solid left lower lobe nodule, minimally progressed since 4/2019. Reassessment can be made on surveillance imaging\par \par 2. Emphysema\par \par --- End of Report ---

## 2024-05-11 NOTE — PHYSICAL EXAM
Labs look good cont same f/u as scheduled [General Appearance - Well Developed] : well developed [Normal Appearance] : normal appearance [General Appearance - Well Nourished] : well nourished [No Deformities] : no deformities [Normal Conjunctiva] : the conjunctiva exhibited no abnormalities [Eyelids - No Xanthelasma] : the eyelids demonstrated no xanthelasmas [Neck Appearance] : the appearance of the neck was normal [Neck Cervical Mass (___cm)] : no neck mass was observed [Jugular Venous Distention Increased] : there was no jugular-venous distention [Heart Rate And Rhythm] : heart rate and rhythm were normal [Heart Sounds] : normal S1 and S2 [Murmurs] : no murmurs present [Arterial Pulses Normal] : the arterial pulses were normal [] : no respiratory distress [Respiration, Rhythm And Depth] : normal respiratory rhythm and effort [Exaggerated Use Of Accessory Muscles For Inspiration] : no accessory muscle use [Auscultation Breath Sounds / Voice Sounds] : lungs were clear to auscultation bilaterally [Bowel Sounds] : normal bowel sounds [Abdomen Soft] : soft [Abnormal Walk] : normal gait [Cranial Nerves] : cranial nerves 2-12 were intact [Deep Tendon Reflexes (DTR)] : deep tendon reflexes were 2+ and symmetric [Oriented To Time, Place, And Person] : oriented to person, place, and time [Impaired Insight] : insight and judgment were intact [Normal Oral Mucosa] : normal oral mucosa [No Oral Pallor] : no oral pallor [No Oral Cyanosis] : no oral cyanosis [Normal Oropharynx] : normal oropharynx [Nail Clubbing] : no clubbing of the fingernails [Cyanosis, Localized] : no localized cyanosis

## 2024-06-27 ENCOUNTER — APPOINTMENT (OUTPATIENT)
Dept: CT IMAGING | Facility: CLINIC | Age: 74
End: 2024-06-27

## 2024-06-27 ENCOUNTER — OUTPATIENT (OUTPATIENT)
Dept: OUTPATIENT SERVICES | Facility: HOSPITAL | Age: 74
LOS: 1 days | End: 2024-06-27
Payer: MEDICARE

## 2024-06-27 DIAGNOSIS — R91.8 OTHER NONSPECIFIC ABNORMAL FINDING OF LUNG FIELD: ICD-10-CM

## 2024-06-27 PROCEDURE — 71250 CT THORAX DX C-: CPT | Mod: 26,MH

## 2024-06-27 PROCEDURE — 71250 CT THORAX DX C-: CPT

## 2024-07-02 ENCOUNTER — APPOINTMENT (OUTPATIENT)
Dept: PULMONOLOGY | Facility: CLINIC | Age: 74
End: 2024-07-02
Payer: MEDICARE

## 2024-07-02 VITALS — WEIGHT: 187 LBS | BODY MASS INDEX: 31.92 KG/M2 | HEIGHT: 64 IN

## 2024-07-02 VITALS
RESPIRATION RATE: 16 BRPM | OXYGEN SATURATION: 98 % | SYSTOLIC BLOOD PRESSURE: 172 MMHG | HEART RATE: 84 BPM | DIASTOLIC BLOOD PRESSURE: 76 MMHG

## 2024-07-02 DIAGNOSIS — R91.8 OTHER NONSPECIFIC ABNORMAL FINDING OF LUNG FIELD: ICD-10-CM

## 2024-07-02 DIAGNOSIS — R91.1 SOLITARY PULMONARY NODULE: ICD-10-CM

## 2024-07-02 DIAGNOSIS — Z87.891 PERSONAL HISTORY OF NICOTINE DEPENDENCE: ICD-10-CM

## 2024-07-02 DIAGNOSIS — J44.89 OTHER SPECIFIED CHRONIC OBSTRUCTIVE PULMONARY DISEASE: ICD-10-CM

## 2024-07-02 DIAGNOSIS — J43.9 EMPHYSEMA, UNSPECIFIED: ICD-10-CM

## 2024-07-02 PROCEDURE — 99214 OFFICE O/P EST MOD 30 MIN: CPT | Mod: 25

## 2024-07-02 PROCEDURE — 94010 BREATHING CAPACITY TEST: CPT

## 2024-08-05 ENCOUNTER — APPOINTMENT (OUTPATIENT)
Dept: THORACIC SURGERY | Facility: CLINIC | Age: 74
End: 2024-08-05

## 2024-08-05 PROCEDURE — 99214 OFFICE O/P EST MOD 30 MIN: CPT

## 2024-08-05 NOTE — DATA REVIEWED
[FreeTextEntry1] : Spirometry performed at [facility] Pul[facility] Medicine at [place] on 7/2/24: FEV 1 41%     CT CHEST  performed through [facility] PROCEDURE DATE:  06/27/2024 INTERPRETATION:  CLINICAL INFORMATION: Lung nodule follow-up COMPARISON: Most recent CT chest 12/19/2023 and older exams dating back to 1/8/2016. CONTRAST/COMPLICATIONS: IV Contrast: None Oral Contrast: None Complications: None  PROCEDURE: CT of the Chest was performed. Sagittal and coronal reformats were performed.  FINDINGS:  LUNGS AND LARGE AIRWAYS: Patent central airways. Stable biapical scarring and emphysema. 1.1 cm left lower lobe groundglass nodule with subcentimeter central dense component unchanged from the previous exam. Increased in size since 2016. Malignancy is not excluded. 2 mm left lower lobe nodule (3:106) unchanged. PLEURA: No pleural effusion. VESSELS: Within normal limits. HEART: Heart size is normal. No pericardial effusion. MEDIASTINUM AND BASIA: No lymphadenopathy. CHEST WALL AND LOWER NECK: Within normal limits. VISUALIZED UPPER ABDOMEN: Bilateral parapelvic renal cysts. Aortic atherosclerotic changes. BONES: Degenerative changes.  IMPRESSION: 1.1 cm left lower lobe groundglass nodule with subcentimeter central dense component unchanged from the previous exam. Increased in size since 2016. Malignancy is not excluded.  --- End of Report ---  SPENCER ARREOLA MD; Attending Radiologist        CT CHEST  performed through [facility] PROCEDURE DATE:  12/19/2023 INTERPRETATION:  EXAMINATION: CT CHEST CLINICAL INDICATION: Lung nodule. TECHNIQUE: Noncontrast CT of the chest was obtained. COMPARISON: Multiple CT, most recent 5/5/2023.  FINDINGS:  AIRWAYS AND LUNGS: The central tracheobronchial tree is patent.  1.1 cm left lower lobe part solid nodule with 4 mm solid component is unchanged from several prior studies but enlarged from 2016. Emphysema is present.  MEDIASTINUM AND PLEURA: There are no enlarged mediastinal, hilar or axillary lymph nodes. The visualized portion of the thyroid gland is unremarkable. There is no pleural effusion. There is no pneumothorax.  HEART AND VESSELS: The heart is normal in size.  There are atherosclerotic calcifications of the aorta and coronary arteries.  There is no pericardial effusion.  UPPER ABDOMEN: Images of the upper abdomen demonstrate bilateral parapelvic renal cysts.  BONES AND SOFT TISSUES: The bones are unremarkable.  The soft tissues are unremarkable.  IMPRESSION: 1.1 cm left lower lobe part solid nodule with 4 mm solid component is unchanged from several prior studies but enlarged from 2016. Indolent malignancy is the primary consideration.  --- End of Report ---  JHONNY MERCADO MD; Attending Radiologist

## 2024-08-05 NOTE — ASSESSMENT
[FreeTextEntry1] : Elvi is a 73-year-old female with a left lower lobe nodule that is part solid and is demonstrated very slow growth since 2019.  It is unchanged from her prior scan.  She was seen a year ago and a PET scan was recommended but this has not yet been completed.  At this point she is in agreement to proceed with PET scan and this will be scheduled through my office.  Thank you for allowing me to participate in the care of your patient.  30 minutes was spent during this encounter.  Antoine Goel MD Department of Cardiovascular and Thoracic Surgery  [employer] at [facility]

## 2024-08-05 NOTE — PHYSICAL EXAM
[] : no respiratory distress [Auscultation Breath Sounds / Voice Sounds] : lungs were clear to auscultation bilaterally [Heart Rate And Rhythm] : heart rate was normal and rhythm regular [Heart Sounds] : normal S1 and S2 [Murmurs] : no murmurs [Heart Sounds Gallop] : no gallops [Heart Sounds Pericardial Friction Rub] : no pericardial rub [Examination Of The Chest] : the chest was normal in appearance [Chest Visual Inspection Thoracic Asymmetry] : no chest asymmetry [Diminished Respiratory Excursion] : normal chest expansion [No Focal Deficits] : no focal deficits [Oriented To Time, Place, And Person] : oriented to person, place, and time [Affect] : the affect was normal [Impaired Insight] : insight and judgment were intact

## 2024-08-05 NOTE — ASSESSMENT
[FreeTextEntry1] : Elvi is a 73-year-old female with a left lower lobe nodule that is part solid and is demonstrated very slow growth since 2019.  It is unchanged from her prior scan.  She was seen a year ago and a PET scan was recommended but this has not yet been completed.  At this point she is in agreement to proceed with PET scan and this will be scheduled through my office.  Thank you for allowing me to participate in the care of your patient.  30 minutes was spent during this encounter.  Antoine Goel MD Department of Cardiovascular and Thoracic Surgery  [employer] at [facility]

## 2024-08-05 NOTE — HISTORY OF PRESENT ILLNESS
[FreeTextEntry1] : Ms. CARLITOS VENTURA is a 73-year female who presents today for followup. Previously, she has been followed for left lower lobe nodule which has slowly demonstrated mild growth since 2019. She presents today to review and discuss surveillance 1 year CT Chest results.  Additional past medical history includes asthma, chronic obstructive pulmonary disease, diverticulosis.  Today the patient reports that she is feeling well; she denies chest pain, palpitations, shortness of breath, cough, dysphagia, nausea/vomiting, fevers, chills, fatigue, unintentional weight loss or gain, and night sweats.

## 2024-08-05 NOTE — DATA REVIEWED
[FreeTextEntry1] : Spirometry performed at [facility] Pulmonary Medicine at [place] on 7/2/24: FEV 1 41%     CT CHEST  performed through [facility] PROCEDURE DATE:  06/27/2024 INTERPRETATION:  CLINICAL INFORMATION: Lung nodule follow-up COMPARISON: Most recent CT chest 12/19/2023 and older exams dating back to 1/8/2016. CONTRAST/COMPLICATIONS: IV Contrast: None Oral Contrast: None Complications: None  PROCEDURE: CT of the Chest was performed. Sagittal and coronal reformats were performed.  FINDINGS:  LUNGS AND LARGE AIRWAYS: Patent central airways. Stable biapical scarring and emphysema. 1.1 cm left lower lobe groundglass nodule with subcentimeter central dense component unchanged from the previous exam. Increased in size since 2016. Malignancy is not excluded. 2 mm left lower lobe nodule (3:106) unchanged. PLEURA: No pleural effusion. VESSELS: Within normal limits. HEART: Heart size is normal. No pericardial effusion. MEDIASTINUM AND BASIA: No lymphadenopathy. CHEST WALL AND LOWER NECK: Within normal limits. VISUALIZED UPPER ABDOMEN: Bilateral parapelvic renal cysts. Aortic atherosclerotic changes. BONES: Degenerative changes.  IMPRESSION: 1.1 cm left lower lobe groundglass nodule with subcentimeter central dense component unchanged from the previous exam. Increased in size since 2016. Malignancy is not excluded.  --- End of Report ---  SPENCER ARREOLA MD; Attending Radiologist        CT CHEST  performed through [facility] PROCEDURE DATE:  12/19/2023 INTERPRETATION:  EXAMINATION: CT CHEST CLINICAL INDICATION: Lung nodule. TECHNIQUE: Noncontrast CT of the chest was obtained. COMPARISON: Multiple CT, most recent 5/5/2023.  FINDINGS:  AIRWAYS AND LUNGS: The central tracheobronchial tree is patent.  1.1 cm left lower lobe part solid nodule with 4 mm solid component is unchanged from several prior studies but enlarged from 2016. Emphysema is present.  MEDIASTINUM AND PLEURA: There are no enlarged mediastinal, hilar or axillary lymph nodes. The visualized portion of the thyroid gland is unremarkable. There is no pleural effusion. There is no pneumothorax.  HEART AND VESSELS: The heart is normal in size.  There are atherosclerotic calcifications of the aorta and coronary arteries.  There is no pericardial effusion.  UPPER ABDOMEN: Images of the upper abdomen demonstrate bilateral parapelvic renal cysts.  BONES AND SOFT TISSUES: The bones are unremarkable.  The soft tissues are unremarkable.  IMPRESSION: 1.1 cm left lower lobe part solid nodule with 4 mm solid component is unchanged from several prior studies but enlarged from 2016. Indolent malignancy is the primary consideration.  --- End of Report ---  JHONNY MERCADO MD; Attending Radiologist

## 2024-08-05 NOTE — REVIEW OF SYSTEMS
[Negative] : Heme/Lymph [Fever] : no fever [Chills] : no chills [Feeling Poorly] : not feeling poorly [Feeling Tired] : not feeling tired [Chest Pain] : no chest pain [Palpitations] : no palpitations [Shortness Of Breath] : no shortness of breath [Cough] : no cough [Wheezing] : no wheezing [SOB on Exertion] : no shortness of breath during exertion

## 2024-08-13 ENCOUNTER — APPOINTMENT (OUTPATIENT)
Dept: NUCLEAR MEDICINE | Facility: CLINIC | Age: 74
End: 2024-08-13
Payer: MEDICARE

## 2024-08-13 PROCEDURE — 78815 PET IMAGE W/CT SKULL-THIGH: CPT | Mod: 26,PI

## 2024-08-21 NOTE — HISTORY OF PRESENT ILLNESS
[FreeTextEntry1] : Ms. CARLITOS VENTURA is a 73-year female who presents today for follow up. Previously, she has been followed for left lower lobe nodule which has slowly demonstrated mild growth since 2019. Additional past medical history includes asthma, chronic obstructive pulmonary disease, diverticulosis.  She was last evaluated on August 5th for annual CT surveillance. It was recommended that she obtain PET imaging 1 year ago however that was not performed. Given her left lower lobe lesion she had went for PET imaging and presents today to discuss these findings.  CT CHEST performed through [facility]12/19/2023: - 1.1 cm left lower lobe part solid nodule with 4 mm solid component is unchanged from several prior studies but enlarged from 2016.  CT CHEST performed through [facility] 06/27/2024: - 1.1 cm left lower lobe groundglass nodule with subcentimeter central dense component unchanged from the previous exam. Increased in size since 2016.  Spirometry performed at [facility] at [place] on 7/2/24: FEV 1 41%  PET imaging from [facility]  08/13/24: - Partially groundglass LEFT LOWER lobe pulmonary nodule demonstrates no discernible uptake period however, indolent or low-grade neoplasm is not excluded on the basis of this latter uptake, as adenocarcinoma in situ often demonstrates minimal or no uptake on FDG PET/CT. - No evidence of FDG-avid malignancy elsewhere

## 2024-08-29 ENCOUNTER — NON-APPOINTMENT (OUTPATIENT)
Age: 74
End: 2024-08-29

## 2024-08-29 ENCOUNTER — APPOINTMENT (OUTPATIENT)
Dept: THORACIC SURGERY | Facility: CLINIC | Age: 74
End: 2024-08-29
Payer: MEDICARE

## 2024-08-29 VITALS
RESPIRATION RATE: 16 BRPM | HEART RATE: 92 BPM | DIASTOLIC BLOOD PRESSURE: 92 MMHG | SYSTOLIC BLOOD PRESSURE: 162 MMHG | HEIGHT: 64 IN | OXYGEN SATURATION: 96 % | WEIGHT: 183 LBS | BODY MASS INDEX: 31.24 KG/M2

## 2024-08-29 DIAGNOSIS — R91.8 OTHER NONSPECIFIC ABNORMAL FINDING OF LUNG FIELD: ICD-10-CM

## 2024-08-29 DIAGNOSIS — R91.1 SOLITARY PULMONARY NODULE: ICD-10-CM

## 2024-08-29 PROCEDURE — 99214 OFFICE O/P EST MOD 30 MIN: CPT

## 2024-08-29 NOTE — REVIEW OF SYSTEMS
“You can access the FollowHealth Patient Portal, offered by [facility], by registering with the following website: [email or web address]”
[Negative] : Heme/Lymph
[Negative] : Heme/Lymph

## 2024-08-29 NOTE — ASSESSMENT
[FreeTextEntry1] : Elvi is a 73-year-old female with a small groundglass opacity in the left lower lobe that has remained largely unchanged in recent studies and mildly enlarged from 2016.  A PET scan was recently negative I do believe continued surveillance is appropriate and I have asked her to obtain a CT scan in 1 years time.  Thank you for allowing me to participate in the care of your patient.  30 minutes was spent during this encounter.  Antoine Goel MD Department of Cardiovascular and Thoracic Surgery  [employer] at [facility]

## 2024-08-29 NOTE — DATA REVIEWED
[FreeTextEntry1] : Independent review of imaging and independent interpretation was performed at today's visit.  - PET imaging from [facility] on 08/05/24

## 2024-08-29 NOTE — HISTORY OF PRESENT ILLNESS
[FreeTextEntry1] : Ms. CARLITOS VENTURA is a 73-year female who presents today for follow up. Previously, she has been followed for left lower lobe nodule which has slowly demonstrated mild growth since 2019. Additional past medical history includes asthma, chronic obstructive pulmonary disease, diverticulosis.  She was last evaluated on August 5th for annual CT surveillance. It was recommended that she obtain PET imaging 1 year ago however that was not performed. Given her left lower lobe lesion she had went for PET imaging and presents today to discuss these findings.  CT CHEST performed through [facility]12/19/2023: - 1.1 cm left lower lobe part solid nodule with 4 mm solid component is unchanged from several prior studies but enlarged from 2016.  CT CHEST performed through [facility] 06/27/2024: - 1.1 cm left lower lobe groundglass nodule with subcentimeter central dense component unchanged from the previous exam. Increased in size since 2016.  Spirometry performed at [facility] at [place] on 7/2/24: FEV 1 41%  PET imaging from [facility]  08/13/24: - Partially groundglass LEFT LOWER lobe pulmonary nodule demonstrates no discernible uptake period however, indolent or low-grade neoplasm is not excluded on the basis of this latter uptake, as adenocarcinoma in situ often demonstrates minimal or no uptake on FDG PET/CT. - No evidence of FDG-avid malignancy elsewhere

## 2024-08-29 NOTE — DATA REVIEWED
[FreeTextEntry1] : Independent review of imaging and independent interpretation was performed at today's visit.  - PET imaging from [facility] on 08/05/24

## 2024-08-29 NOTE — HISTORY OF PRESENT ILLNESS
[FreeTextEntry1] : Ms. CARLITOS VENTURA is a 73-year female who presents today for follow up. Previously, she has been followed for left lower lobe nodule which has slowly demonstrated mild growth since 2019. Additional past medical history includes asthma, chronic obstructive pulmonary disease, diverticulosis.  She was last evaluated on August 5th for annual CT surveillance. It was recommended that she obtain PET imaging 1 year ago however that was not performed. Given her left lower lobe lesion she had went for PET imaging and presents today to discuss these findings.  CT CHEST performed through [facility]12/19/2023: - 1.1 cm left lower lobe part solid nodule with 4 mm solid component is unchanged from several prior studies but enlarged from 2016.  CT CHEST performed through [facility] 06/27/2024: - 1.1 cm left lower lobe groundglass nodule with subcentimeter central dense component unchanged from the previous exam. Increased in size since 2016.  Spirometry performed at [facility] at [place] on 7/2/24: FEV 1 41%  PET imaging from [facility]  08/13/24: - Partially groundglass LEFT LOWER lobe pulmonary nodule demonstrates no discernible uptake period however, indolent or low-grade neoplasm is not excluded on the basis of this latter uptake, as adenocarcinoma in situ often demonstrates minimal or no uptake on FDG PET/CT. - No evidence of FDG-avid malignancy elsewhere

## 2024-08-29 NOTE — PHYSICAL EXAM
[] : no respiratory distress [Auscultation Breath Sounds / Voice Sounds] : lungs were clear to auscultation bilaterally [Heart Rate And Rhythm] : heart rate was normal and rhythm regular [Heart Sounds] : normal S1 and S2 [Heart Sounds Gallop] : no gallops [Murmurs] : no murmurs [Heart Sounds Pericardial Friction Rub] : no pericardial rub [Examination Of The Chest] : the chest was normal in appearance [Chest Visual Inspection Thoracic Asymmetry] : no chest asymmetry [Diminished Respiratory Excursion] : normal chest expansion [No Focal Deficits] : no focal deficits [Oriented To Time, Place, And Person] : oriented to person, place, and time [Affect] : the affect was normal [Impaired Insight] : insight and judgment were intact

## 2024-08-29 NOTE — ASSESSMENT
[FreeTextEntry1] : Elvi is a 73-year-old female with a small groundglass opacity in the left lower lobe that has remained largely unchanged in recent studies and mildly enlarged from 2016.  A PET scan was recently negative I do believe continued surveillance is appropriate and I have asked her to obtain a CT scan in 1 years time.  Thank you for allowing me to participate in the care of your patient.  30 minutes was spent during this encounter.  Antoine Goel MD Department of Cardiovascular and Thoracic Surgery  [employer] at [facility]

## 2025-04-07 NOTE — DATA REVIEWED
[FreeTextEntry1] : Independent review of imaging and independent interpretation was performed at today's visit.  - PET imaging from [facility] on 08/05/24 MAR

## 2025-08-29 ENCOUNTER — OUTPATIENT (OUTPATIENT)
Dept: OUTPATIENT SERVICES | Facility: HOSPITAL | Age: 75
LOS: 1 days | End: 2025-08-29
Payer: MEDICARE

## 2025-08-29 ENCOUNTER — APPOINTMENT (OUTPATIENT)
Dept: CT IMAGING | Facility: CLINIC | Age: 75
End: 2025-08-29

## 2025-08-29 DIAGNOSIS — R91.8 OTHER NONSPECIFIC ABNORMAL FINDING OF LUNG FIELD: ICD-10-CM

## 2025-08-29 DIAGNOSIS — R91.1 SOLITARY PULMONARY NODULE: ICD-10-CM

## 2025-08-29 PROCEDURE — 71250 CT THORAX DX C-: CPT | Mod: 26

## 2025-08-29 PROCEDURE — 71250 CT THORAX DX C-: CPT

## 2025-09-04 ENCOUNTER — NON-APPOINTMENT (OUTPATIENT)
Age: 75
End: 2025-09-04

## 2025-09-05 ENCOUNTER — NON-APPOINTMENT (OUTPATIENT)
Age: 75
End: 2025-09-05

## 2025-09-05 ENCOUNTER — APPOINTMENT (OUTPATIENT)
Facility: CLINIC | Age: 75
End: 2025-09-05
Payer: MEDICARE

## 2025-09-05 VITALS
SYSTOLIC BLOOD PRESSURE: 140 MMHG | WEIGHT: 181 LBS | HEART RATE: 96 BPM | OXYGEN SATURATION: 97 % | BODY MASS INDEX: 30.9 KG/M2 | DIASTOLIC BLOOD PRESSURE: 90 MMHG | HEIGHT: 64 IN

## 2025-09-05 DIAGNOSIS — R91.1 SOLITARY PULMONARY NODULE: ICD-10-CM

## 2025-09-05 PROCEDURE — 99214 OFFICE O/P EST MOD 30 MIN: CPT
